# Patient Record
Sex: FEMALE | Race: WHITE | NOT HISPANIC OR LATINO | Employment: OTHER | URBAN - METROPOLITAN AREA
[De-identification: names, ages, dates, MRNs, and addresses within clinical notes are randomized per-mention and may not be internally consistent; named-entity substitution may affect disease eponyms.]

---

## 2017-03-13 PROBLEM — R19.5 HEME + STOOL: Status: ACTIVE | Noted: 2017-03-13

## 2017-08-31 PROBLEM — J45.901 ASTHMA EXACERBATION WITH COPD (CHRONIC OBSTRUCTIVE PULMONARY DISEASE): Status: ACTIVE | Noted: 2017-08-31

## 2017-08-31 PROBLEM — E87.6 HYPOKALEMIA: Status: ACTIVE | Noted: 2017-08-31

## 2017-08-31 PROBLEM — J44.1 ASTHMA EXACERBATION WITH COPD (CHRONIC OBSTRUCTIVE PULMONARY DISEASE): Status: ACTIVE | Noted: 2017-08-31

## 2017-09-03 PROBLEM — D72.829 LEUKOCYTOSIS: Status: ACTIVE | Noted: 2017-09-03

## 2018-08-27 PROBLEM — D72.829 LEUKOCYTOSIS: Status: RESOLVED | Noted: 2017-09-03 | Resolved: 2018-08-27

## 2018-08-27 PROBLEM — E87.6 HYPOKALEMIA: Status: RESOLVED | Noted: 2017-08-31 | Resolved: 2018-08-27

## 2018-08-27 PROBLEM — R74.8 ELEVATED LIVER ENZYMES: Status: ACTIVE | Noted: 2018-08-27

## 2019-01-23 PROBLEM — R19.5 HEME + STOOL: Status: RESOLVED | Noted: 2017-03-13 | Resolved: 2019-01-23

## 2019-01-23 PROBLEM — E05.90 SUBCLINICAL HYPERTHYROIDISM: Status: ACTIVE | Noted: 2019-01-23

## 2019-10-02 PROBLEM — E05.90 SUBCLINICAL HYPERTHYROIDISM: Chronic | Status: ACTIVE | Noted: 2019-01-23

## 2019-10-02 PROBLEM — R74.8 ELEVATED LIVER ENZYMES: Chronic | Status: ACTIVE | Noted: 2018-08-27

## 2020-02-10 PROBLEM — I50.41 ACUTE COMBINED SYSTOLIC AND DIASTOLIC HEART FAILURE: Status: ACTIVE | Noted: 2020-02-10

## 2020-03-17 PROBLEM — I50.42 CHRONIC COMBINED SYSTOLIC AND DIASTOLIC HEART FAILURE: Chronic | Status: ACTIVE | Noted: 2020-02-10

## 2020-03-17 PROBLEM — I50.42 CHRONIC COMBINED SYSTOLIC AND DIASTOLIC HEART FAILURE: Status: ACTIVE | Noted: 2020-02-10

## 2021-06-21 PROBLEM — R73.9 HYPERGLYCEMIA: Status: ACTIVE | Noted: 2021-06-21

## 2021-06-21 PROBLEM — R06.02 SHORTNESS OF BREATH: Chronic | Status: ACTIVE | Noted: 2021-06-21

## 2022-01-31 ENCOUNTER — PATIENT OUTREACH (OUTPATIENT)
Dept: ADMINISTRATIVE | Facility: HOSPITAL | Age: 72
End: 2022-01-31
Payer: MEDICARE

## 2022-01-31 NOTE — PROGRESS NOTES
[facility] chart audits-HYPERTENSION.      OV  6.21.21                      B/P 126/70    HTN diagnosis documented within time frame of measurement year.

## 2022-09-09 PROBLEM — I25.10 CAD (CORONARY ARTERY DISEASE): Status: ACTIVE | Noted: 2022-09-09

## 2022-09-09 PROBLEM — R07.9 CHEST PAIN: Status: ACTIVE | Noted: 2022-09-09

## 2022-09-09 PROBLEM — R47.1 DYSARTHRIA: Status: ACTIVE | Noted: 2022-09-09

## 2022-09-11 PROBLEM — F41.9 ANXIETY: Status: ACTIVE | Noted: 2022-09-11

## 2022-09-13 PROBLEM — F41.9 ANXIETY: Status: RESOLVED | Noted: 2022-09-11 | Resolved: 2022-09-13

## 2022-09-13 PROBLEM — R07.9 CHEST PAIN: Status: RESOLVED | Noted: 2022-09-09 | Resolved: 2022-09-13

## 2023-06-22 PROBLEM — J44.1 ASTHMA EXACERBATION WITH COPD (CHRONIC OBSTRUCTIVE PULMONARY DISEASE): Status: RESOLVED | Noted: 2017-08-31 | Resolved: 2023-06-22

## 2023-06-22 PROBLEM — J45.901 ASTHMA EXACERBATION WITH COPD (CHRONIC OBSTRUCTIVE PULMONARY DISEASE): Status: RESOLVED | Noted: 2017-08-31 | Resolved: 2023-06-22

## 2024-03-14 PROBLEM — K59.09 CHRONIC CONSTIPATION: Status: ACTIVE | Noted: 2024-03-14

## 2024-03-14 PROBLEM — M85.80 OSTEOPENIA: Status: ACTIVE | Noted: 2024-03-14

## 2024-03-14 PROBLEM — K52.9 CHRONIC DIARRHEA: Status: ACTIVE | Noted: 2024-03-14

## 2024-03-14 PROBLEM — R19.7 DIARRHEA: Status: ACTIVE | Noted: 2024-03-14

## 2024-03-14 PROBLEM — K59.09 CHRONIC CONSTIPATION: Status: RESOLVED | Noted: 2024-03-14 | Resolved: 2024-03-14

## 2024-03-20 PROBLEM — R91.8 PULMONARY NODULES: Status: ACTIVE | Noted: 2024-03-20
